# Patient Record
Sex: MALE | Race: WHITE | NOT HISPANIC OR LATINO | Employment: FULL TIME | URBAN - METROPOLITAN AREA
[De-identification: names, ages, dates, MRNs, and addresses within clinical notes are randomized per-mention and may not be internally consistent; named-entity substitution may affect disease eponyms.]

---

## 2023-11-09 PROBLEM — R35.0 BENIGN PROSTATIC HYPERPLASIA WITH URINARY FREQUENCY: Status: ACTIVE | Noted: 2023-11-09

## 2023-11-09 PROBLEM — E66.3 OVERWEIGHT WITH BODY MASS INDEX (BMI) OF 28 TO 28.9 IN ADULT: Status: ACTIVE | Noted: 2023-11-09

## 2023-11-09 PROBLEM — E78.5 HYPERLIPIDEMIA: Status: ACTIVE | Noted: 2023-11-09

## 2023-11-09 PROBLEM — I10 ESSENTIAL HYPERTENSION: Status: ACTIVE | Noted: 2023-11-09

## 2023-11-09 PROBLEM — N40.1 BENIGN PROSTATIC HYPERPLASIA WITH URINARY FREQUENCY: Status: ACTIVE | Noted: 2023-11-09

## 2023-11-09 PROBLEM — K21.9 GERD (GASTROESOPHAGEAL REFLUX DISEASE): Status: ACTIVE | Noted: 2023-11-09

## 2023-11-09 PROBLEM — R35.1 NOCTURIA: Status: ACTIVE | Noted: 2023-11-09

## 2023-11-09 PROBLEM — N52.9 ERECTILE DYSFUNCTION: Status: ACTIVE | Noted: 2023-11-09

## 2023-11-09 PROBLEM — Z78.9 NON-SMOKER: Status: ACTIVE | Noted: 2023-11-09

## 2023-11-09 RX ORDER — PRAVASTATIN SODIUM 20 MG/1
1 TABLET ORAL NIGHTLY
COMMUNITY
Start: 2022-04-25 | End: 2023-11-10 | Stop reason: SDUPTHER

## 2023-11-09 RX ORDER — TAMSULOSIN HYDROCHLORIDE 0.4 MG/1
1 CAPSULE ORAL NIGHTLY
COMMUNITY
Start: 2021-10-25 | End: 2024-05-13 | Stop reason: ALTCHOICE

## 2023-11-09 RX ORDER — LISINOPRIL 20 MG/1
1 TABLET ORAL DAILY
COMMUNITY
End: 2023-11-10 | Stop reason: SDUPTHER

## 2023-11-09 RX ORDER — OMEPRAZOLE 20 MG/1
1 CAPSULE, DELAYED RELEASE ORAL DAILY
COMMUNITY
Start: 2022-05-16 | End: 2023-11-10 | Stop reason: SDUPTHER

## 2023-11-09 RX ORDER — TADALAFIL 20 MG/1
1 TABLET ORAL DAILY PRN
COMMUNITY
Start: 2021-10-25 | End: 2024-05-13 | Stop reason: ALTCHOICE

## 2023-11-10 ENCOUNTER — OFFICE VISIT (OUTPATIENT)
Dept: PRIMARY CARE | Facility: CLINIC | Age: 58
End: 2023-11-10
Payer: COMMERCIAL

## 2023-11-10 VITALS
SYSTOLIC BLOOD PRESSURE: 145 MMHG | HEART RATE: 75 BPM | TEMPERATURE: 97.5 F | DIASTOLIC BLOOD PRESSURE: 77 MMHG | HEIGHT: 66 IN | WEIGHT: 175 LBS | BODY MASS INDEX: 28.12 KG/M2

## 2023-11-10 DIAGNOSIS — I10 ESSENTIAL HYPERTENSION: ICD-10-CM

## 2023-11-10 DIAGNOSIS — K21.9 GASTROESOPHAGEAL REFLUX DISEASE, UNSPECIFIED WHETHER ESOPHAGITIS PRESENT: ICD-10-CM

## 2023-11-10 DIAGNOSIS — R35.0 BENIGN PROSTATIC HYPERPLASIA WITH URINARY FREQUENCY: Primary | ICD-10-CM

## 2023-11-10 DIAGNOSIS — N40.1 BENIGN PROSTATIC HYPERPLASIA WITH URINARY FREQUENCY: Primary | ICD-10-CM

## 2023-11-10 DIAGNOSIS — E78.5 HYPERLIPIDEMIA, UNSPECIFIED HYPERLIPIDEMIA TYPE: ICD-10-CM

## 2023-11-10 PROCEDURE — 3078F DIAST BP <80 MM HG: CPT | Performed by: FAMILY MEDICINE

## 2023-11-10 PROCEDURE — 1036F TOBACCO NON-USER: CPT | Performed by: FAMILY MEDICINE

## 2023-11-10 PROCEDURE — 3077F SYST BP >= 140 MM HG: CPT | Performed by: FAMILY MEDICINE

## 2023-11-10 PROCEDURE — 99213 OFFICE O/P EST LOW 20 MIN: CPT | Performed by: FAMILY MEDICINE

## 2023-11-10 RX ORDER — LISINOPRIL 20 MG/1
20 TABLET ORAL DAILY
Qty: 90 TABLET | Refills: 1 | Status: SHIPPED | OUTPATIENT
Start: 2023-11-10

## 2023-11-10 RX ORDER — PRAVASTATIN SODIUM 20 MG/1
20 TABLET ORAL NIGHTLY
Qty: 90 TABLET | Refills: 1 | Status: SHIPPED | OUTPATIENT
Start: 2023-11-10

## 2023-11-10 RX ORDER — OMEPRAZOLE 20 MG/1
20 CAPSULE, DELAYED RELEASE ORAL DAILY
Qty: 90 CAPSULE | Refills: 1 | Status: SHIPPED | OUTPATIENT
Start: 2023-11-10 | End: 2024-04-25

## 2023-11-10 ASSESSMENT — ENCOUNTER SYMPTOMS
MUSCULOSKELETAL NEGATIVE: 1
ENDOCRINE NEGATIVE: 1
RESPIRATORY NEGATIVE: 1
EYES NEGATIVE: 1
CARDIOVASCULAR NEGATIVE: 1
GASTROINTESTINAL NEGATIVE: 1
HEMATOLOGIC/LYMPHATIC NEGATIVE: 1
ALLERGIC/IMMUNOLOGIC NEGATIVE: 1
CONSTITUTIONAL NEGATIVE: 1
PSYCHIATRIC NEGATIVE: 1

## 2023-11-10 ASSESSMENT — PATIENT HEALTH QUESTIONNAIRE - PHQ9
SUM OF ALL RESPONSES TO PHQ9 QUESTIONS 1 AND 2: 0
1. LITTLE INTEREST OR PLEASURE IN DOING THINGS: NOT AT ALL
2. FEELING DOWN, DEPRESSED OR HOPELESS: NOT AT ALL

## 2023-11-10 NOTE — PROGRESS NOTES
Bubba Ching is here for a follow-up on hypertension and hyperlipidemia.  He states that he has been feeling well and has no new complaints.  He continues on his meds noted without side effects.  His GERD has been stable with the use of omeprazole 20 mg daily.  He is due for follow-up labs including lipid profile.    Patient ID: Humza Lantigua is a 58 y.o. male who presents for Follow-up (Will need refills: No other concerns:):    Problem List Items Addressed This Visit       Essential hypertension    GERD (gastroesophageal reflux disease)    Hyperlipidemia      Past Medical History:   Diagnosis Date    Body mass index (BMI) 28.0-28.9, adult     BMI 28.0-28.9,adult    GERD (gastroesophageal reflux disease) 01/01/2000    Hypertension 01/01/2000    Overweight 03/29/2022    Overweight with body mass index (BMI) of 29 to 29.9 in adult    Overweight     Overweight    Personal history of other specified conditions     History of urinary frequency    Personal history of other specified conditions 12/27/2021    History of urinary urgency    Personal history of other specified conditions 12/27/2021    History of nocturia    Poor urinary stream 12/27/2021    Weak urinary stream      Past Surgical History:   Procedure Laterality Date    CHOLECYSTECTOMY  01/01/2000    OTHER SURGICAL HISTORY  10/25/2021    Cholelithotomy    OTHER SURGICAL HISTORY  02/02/2022    Cholecystectomy    OTHER SURGICAL HISTORY  03/29/2022    Colonoscopy      Family History   Problem Relation Name Age of Onset    Hypertension Mother      Diabetes Mother      Hypertension Father      Diabetes Father        Social History     Socioeconomic History    Marital status: Single     Spouse name: Not on file    Number of children: Not on file    Years of education: Not on file    Highest education level: Not on file   Occupational History    Not on file   Tobacco Use    Smoking status: Never    Smokeless tobacco: Never   Substance and Sexual Activity    Alcohol  use: Yes     Alcohol/week: 6.0 standard drinks of alcohol     Types: 6 Cans of beer per week     Comment: Monthly    Drug use: Never    Sexual activity: Not Currently     Partners: Female     Birth control/protection: None   Other Topics Concern    Not on file   Social History Narrative    Not on file     Social Determinants of Health     Financial Resource Strain: Not on file   Food Insecurity: Not on file   Transportation Needs: Not on file   Physical Activity: Not on file   Stress: Not on file   Social Connections: Not on file   Intimate Partner Violence: Not on file   Housing Stability: Not on file      Hay fever and allergy relief   Current Outpatient Medications   Medication Sig Dispense Refill    lisinopril 20 mg tablet Take 1 tablet (20 mg) by mouth once daily.      omeprazole (PriLOSEC) 20 mg DR capsule Take 1 capsule (20 mg) by mouth once daily.      pravastatin (Pravachol) 20 mg tablet Take 1 tablet (20 mg) by mouth once daily at bedtime.      tadalafil 20 mg tablet Take 1 tablet (20 mg) by mouth once daily as needed. 1 HOUR BEFORE NEEDED      tamsulosin (Flomax) 0.4 mg 24 hr capsule Take 1 capsule (0.4 mg) by mouth once daily at bedtime.       No current facility-administered medications for this visit.       Immunization History   Administered Date(s) Administered    Flu vaccine (IIV4), preservative free *Check age/dose* 09/27/2021, 09/30/2022    Influenza, Unspecified 11/01/2017    Influenza, injectable, MDCK, preservative free, quadrivalent 10/15/2018, 10/05/2020, 09/28/2023    Influenza, injectable, MDCK, quadrivalent 10/14/2019    Pfizer COVID-19 vaccine, bivalent, age 12 years and older (30 mcg/0.3 mL) 09/30/2022    Pfizer Gray Cap SARS-CoV-2 05/17/2022    Pfizer Purple Cap SARS-CoV-2 04/10/2021, 05/02/2021, 12/05/2021    Tdap vaccine, age 7 year and older (BOOSTRIX) 06/23/2020        Review of Systems   Constitutional: Negative.    HENT: Negative.     Eyes: Negative.    Respiratory: Negative.      Cardiovascular: Negative.    Gastrointestinal: Negative.    Endocrine: Negative.    Genitourinary: Negative.    Musculoskeletal: Negative.    Skin: Negative.    Allergic/Immunologic: Negative.    Hematological: Negative.    Psychiatric/Behavioral: Negative.     All other systems reviewed and are negative.       Vitals:    11/10/23 1037   BP: 145/77   Pulse: 75   Temp: 36.4 °C (97.5 °F)     Vitals:    11/10/23 1037   Weight: 79.4 kg (175 lb)      Physical Exam  Constitutional:       General: He is not in acute distress.     Appearance: Normal appearance. He is normal weight.   Cardiovascular:      Rate and Rhythm: Normal rate and regular rhythm.      Pulses: Normal pulses.      Heart sounds: Normal heart sounds. No murmur heard.     No friction rub. No gallop.   Pulmonary:      Effort: Pulmonary effort is normal.      Breath sounds: Normal breath sounds.   Neurological:      Mental Status: He is alert.   Psychiatric:         Mood and Affect: Mood normal.         Thought Content: Thought content normal.          ASSESSMENT/PLAN: Hypertension with slightly elevated reading  Hyperlipidemia  GERD stable  BPH followed by urology    Plan-check CBC CMP lipid profile and PSA  Continue current meds noted  Exercise regularly  Colonoscopy up-to-date  Needs to update Shingrix vaccination  Follow-up in 6 months pending above and call as needed

## 2024-02-10 ENCOUNTER — LAB (OUTPATIENT)
Dept: LAB | Facility: LAB | Age: 59
End: 2024-02-10
Payer: COMMERCIAL

## 2024-02-10 DIAGNOSIS — N40.1 BENIGN PROSTATIC HYPERPLASIA WITH URINARY FREQUENCY: ICD-10-CM

## 2024-02-10 DIAGNOSIS — I10 ESSENTIAL HYPERTENSION: ICD-10-CM

## 2024-02-10 DIAGNOSIS — K21.9 GASTROESOPHAGEAL REFLUX DISEASE, UNSPECIFIED WHETHER ESOPHAGITIS PRESENT: ICD-10-CM

## 2024-02-10 DIAGNOSIS — R35.0 BENIGN PROSTATIC HYPERPLASIA WITH URINARY FREQUENCY: ICD-10-CM

## 2024-02-10 LAB
ALBUMIN SERPL BCP-MCNC: 4.6 G/DL (ref 3.4–5)
ALP SERPL-CCNC: 52 U/L (ref 33–120)
ALT SERPL W P-5'-P-CCNC: 18 U/L (ref 10–52)
ANION GAP SERPL CALC-SCNC: 13 MMOL/L (ref 10–20)
AST SERPL W P-5'-P-CCNC: 16 U/L (ref 9–39)
BILIRUB SERPL-MCNC: 0.6 MG/DL (ref 0–1.2)
BUN SERPL-MCNC: 18 MG/DL (ref 6–23)
CALCIUM SERPL-MCNC: 9.5 MG/DL (ref 8.6–10.3)
CHLORIDE SERPL-SCNC: 104 MMOL/L (ref 98–107)
CHOLEST SERPL-MCNC: 161 MG/DL (ref 0–199)
CHOLESTEROL/HDL RATIO: 3.3
CO2 SERPL-SCNC: 26 MMOL/L (ref 21–32)
CREAT SERPL-MCNC: 0.94 MG/DL (ref 0.5–1.3)
EGFRCR SERPLBLD CKD-EPI 2021: >90 ML/MIN/1.73M*2
ERYTHROCYTE [DISTWIDTH] IN BLOOD BY AUTOMATED COUNT: 12.2 % (ref 11.5–14.5)
GLUCOSE SERPL-MCNC: 107 MG/DL (ref 74–99)
HCT VFR BLD AUTO: 45 % (ref 41–52)
HDLC SERPL-MCNC: 48.4 MG/DL
HGB BLD-MCNC: 14.8 G/DL (ref 13.5–17.5)
LDLC SERPL CALC-MCNC: 100 MG/DL
MCH RBC QN AUTO: 31.7 PG (ref 26–34)
MCHC RBC AUTO-ENTMCNC: 32.9 G/DL (ref 32–36)
MCV RBC AUTO: 96 FL (ref 80–100)
NON HDL CHOLESTEROL: 113 MG/DL (ref 0–149)
NRBC BLD-RTO: 0 /100 WBCS (ref 0–0)
PLATELET # BLD AUTO: 237 X10*3/UL (ref 150–450)
POTASSIUM SERPL-SCNC: 4.7 MMOL/L (ref 3.5–5.3)
PROT SERPL-MCNC: 7.1 G/DL (ref 6.4–8.2)
PSA SERPL-MCNC: 0.56 NG/ML
RBC # BLD AUTO: 4.67 X10*6/UL (ref 4.5–5.9)
SODIUM SERPL-SCNC: 138 MMOL/L (ref 136–145)
TRIGL SERPL-MCNC: 61 MG/DL (ref 0–149)
VLDL: 12 MG/DL (ref 0–40)
WBC # BLD AUTO: 5.2 X10*3/UL (ref 4.4–11.3)

## 2024-02-10 PROCEDURE — 80061 LIPID PANEL: CPT

## 2024-02-10 PROCEDURE — 85027 COMPLETE CBC AUTOMATED: CPT

## 2024-02-10 PROCEDURE — 84153 ASSAY OF PSA TOTAL: CPT

## 2024-02-10 PROCEDURE — 36415 COLL VENOUS BLD VENIPUNCTURE: CPT

## 2024-02-10 PROCEDURE — 80053 COMPREHEN METABOLIC PANEL: CPT

## 2024-04-24 DIAGNOSIS — K21.9 GASTROESOPHAGEAL REFLUX DISEASE, UNSPECIFIED WHETHER ESOPHAGITIS PRESENT: ICD-10-CM

## 2024-04-25 RX ORDER — OMEPRAZOLE 20 MG/1
20 CAPSULE, DELAYED RELEASE ORAL DAILY
Qty: 90 CAPSULE | Refills: 1 | Status: SHIPPED | OUTPATIENT
Start: 2024-04-25

## 2024-05-13 ENCOUNTER — OFFICE VISIT (OUTPATIENT)
Dept: PRIMARY CARE | Facility: CLINIC | Age: 59
End: 2024-05-13
Payer: COMMERCIAL

## 2024-05-13 VITALS
SYSTOLIC BLOOD PRESSURE: 124 MMHG | BODY MASS INDEX: 27.64 KG/M2 | HEART RATE: 80 BPM | HEIGHT: 66 IN | DIASTOLIC BLOOD PRESSURE: 74 MMHG | TEMPERATURE: 98.2 F | WEIGHT: 172 LBS

## 2024-05-13 DIAGNOSIS — E66.3 OVERWEIGHT WITH BODY MASS INDEX (BMI) OF 27 TO 27.9 IN ADULT: ICD-10-CM

## 2024-05-13 DIAGNOSIS — E78.2 MIXED HYPERLIPIDEMIA: ICD-10-CM

## 2024-05-13 DIAGNOSIS — Z78.9 NEVER SMOKED CIGARETTES: ICD-10-CM

## 2024-05-13 DIAGNOSIS — I10 ESSENTIAL HYPERTENSION: ICD-10-CM

## 2024-05-13 PROCEDURE — 1036F TOBACCO NON-USER: CPT | Performed by: FAMILY MEDICINE

## 2024-05-13 PROCEDURE — 99213 OFFICE O/P EST LOW 20 MIN: CPT | Performed by: FAMILY MEDICINE

## 2024-05-13 PROCEDURE — 3074F SYST BP LT 130 MM HG: CPT | Performed by: FAMILY MEDICINE

## 2024-05-13 PROCEDURE — 3008F BODY MASS INDEX DOCD: CPT | Performed by: FAMILY MEDICINE

## 2024-05-13 PROCEDURE — 3078F DIAST BP <80 MM HG: CPT | Performed by: FAMILY MEDICINE

## 2024-05-13 ASSESSMENT — ENCOUNTER SYMPTOMS
CONSTITUTIONAL NEGATIVE: 1
MUSCULOSKELETAL NEGATIVE: 1
GASTROINTESTINAL NEGATIVE: 1
CARDIOVASCULAR NEGATIVE: 1
HEMATOLOGIC/LYMPHATIC NEGATIVE: 1
RESPIRATORY NEGATIVE: 1
PSYCHIATRIC NEGATIVE: 1
EYES NEGATIVE: 1
ALLERGIC/IMMUNOLOGIC NEGATIVE: 1
ENDOCRINE NEGATIVE: 1

## 2024-05-13 NOTE — PROGRESS NOTES
Bubba Ching is here for follow-up on hypertension and hyperlipidemia.  He states that he has been feeling well and has no new complaints at the present time.  He continues on his meds noted without side effects.  He does not exercise on a regular basis.  He has never smoked.  He drinks about 6 cans of beer per week.    Patient ID: Humza Lantigua is a 58 y.o. male who presents for Follow-up:    Problem List Items Addressed This Visit       Essential hypertension    Hyperlipidemia     Other Visit Diagnoses       Never smoked cigarettes        Overweight with body mass index (BMI) of 27 to 27.9 in adult               Past Medical History:   Diagnosis Date    Body mass index (BMI) 28.0-28.9, adult     BMI 28.0-28.9,adult    GERD (gastroesophageal reflux disease) 01/01/2000    Hypertension 01/01/2000    Overweight 03/29/2022    Overweight with body mass index (BMI) of 29 to 29.9 in adult    Overweight     Overweight    Personal history of other specified conditions     History of urinary frequency    Personal history of other specified conditions 12/27/2021    History of urinary urgency    Personal history of other specified conditions 12/27/2021    History of nocturia    Poor urinary stream 12/27/2021    Weak urinary stream      Past Surgical History:   Procedure Laterality Date    CHOLECYSTECTOMY  01/01/2000    OTHER SURGICAL HISTORY  10/25/2021    Cholelithotomy    OTHER SURGICAL HISTORY  02/02/2022    Cholecystectomy    OTHER SURGICAL HISTORY  03/29/2022    Colonoscopy      Family History   Problem Relation Name Age of Onset    Hypertension Mother      Diabetes Mother      Hypertension Father      Diabetes Father        Social History     Socioeconomic History    Marital status: Single     Spouse name: Not on file    Number of children: Not on file    Years of education: Not on file    Highest education level: Not on file   Occupational History    Not on file   Tobacco Use    Smoking status: Never    Smokeless  tobacco: Never   Substance and Sexual Activity    Alcohol use: Yes     Alcohol/week: 6.0 standard drinks of alcohol     Types: 6 Cans of beer per week     Comment: Monthly    Drug use: Never    Sexual activity: Not Currently     Partners: Female     Birth control/protection: None   Other Topics Concern    Not on file   Social History Narrative    Not on file     Social Determinants of Health     Financial Resource Strain: Not on file   Food Insecurity: Not on file   Transportation Needs: Not on file   Physical Activity: Not on file   Stress: Not on file   Social Connections: Not on file   Intimate Partner Violence: Not on file   Housing Stability: Not on file      Hay fever and allergy relief   Current Outpatient Medications   Medication Sig Dispense Refill    lisinopril 20 mg tablet Take 1 tablet (20 mg) by mouth once daily. 90 tablet 1    omeprazole (PriLOSEC) 20 mg DR capsule TAKE 1 CAPSULE BY MOUTH EVERY DAY 90 capsule 1    pravastatin (Pravachol) 20 mg tablet Take 1 tablet (20 mg) by mouth once daily at bedtime. 90 tablet 1     No current facility-administered medications for this visit.       Immunization History   Administered Date(s) Administered    Flu vaccine (IIV4), preservative free *Check age/dose* 09/27/2021, 09/30/2022    Flu vaccine, quadrivalent, no egg protein, age 6 month or greater (FLUCELVAX) 10/15/2018, 10/05/2020, 09/28/2023    Influenza, Unspecified 11/01/2017    Influenza, injectable, MDCK, quadrivalent 10/14/2019    Pfizer COVID-19 vaccine, bivalent, age 12 years and older (30 mcg/0.3 mL) 09/30/2022    Pfizer Gray Cap SARS-CoV-2 05/17/2022    Pfizer Purple Cap SARS-CoV-2 04/10/2021, 05/02/2021, 12/05/2021    Tdap vaccine, age 7 year and older (BOOSTRIX, ADACEL) 06/23/2020        Review of Systems   Constitutional: Negative.    HENT: Negative.     Eyes: Negative.    Respiratory: Negative.     Cardiovascular: Negative.    Gastrointestinal: Negative.    Endocrine: Negative.    Genitourinary:  Negative.    Musculoskeletal: Negative.    Skin: Negative.    Allergic/Immunologic: Negative.    Hematological: Negative.    Psychiatric/Behavioral: Negative.     All other systems reviewed and are negative.       Vitals:    05/13/24 1600   BP: 124/74   Pulse: 80   Temp: 36.8 °C (98.2 °F)     Vitals:    05/13/24 1600   Weight: 78 kg (172 lb)      Physical Exam  Constitutional:       General: He is not in acute distress.     Appearance: Normal appearance.   Neck:      Vascular: No carotid bruit.   Cardiovascular:      Rate and Rhythm: Normal rate and regular rhythm.      Pulses: Normal pulses.      Heart sounds: Normal heart sounds. No murmur heard.     No friction rub. No gallop.   Pulmonary:      Effort: Pulmonary effort is normal. No respiratory distress.      Breath sounds: Normal breath sounds. No wheezing or rales.   Musculoskeletal:      Cervical back: Neck supple.   Neurological:      Mental Status: He is alert.          ASSESSMENT/PLAN: Hypertension stable.  Continue lisinopril daily as noted.    Hyperlipidemia stable with cholesterol 161 and .  Continue pravastatin daily.  Watch low-fat diet.    BPH.  PSA stable 0.56    GERD.  Continue omeprazole daily.    Continue current meds noted  Recommended shingles vaccination  Colonoscopy up-to-date  Follow-up 6 months and call as needed

## 2024-06-14 DIAGNOSIS — I10 ESSENTIAL HYPERTENSION: ICD-10-CM

## 2024-06-14 DIAGNOSIS — E78.5 HYPERLIPIDEMIA, UNSPECIFIED HYPERLIPIDEMIA TYPE: ICD-10-CM

## 2024-06-15 RX ORDER — PRAVASTATIN SODIUM 20 MG/1
20 TABLET ORAL NIGHTLY
Qty: 90 TABLET | Refills: 1 | Status: SHIPPED | OUTPATIENT
Start: 2024-06-15

## 2024-06-15 RX ORDER — LISINOPRIL 20 MG/1
20 TABLET ORAL DAILY
Qty: 90 TABLET | Refills: 1 | Status: SHIPPED | OUTPATIENT
Start: 2024-06-15

## 2024-11-03 DIAGNOSIS — K21.9 GASTROESOPHAGEAL REFLUX DISEASE, UNSPECIFIED WHETHER ESOPHAGITIS PRESENT: ICD-10-CM

## 2024-11-04 ENCOUNTER — APPOINTMENT (OUTPATIENT)
Dept: PRIMARY CARE | Facility: CLINIC | Age: 59
End: 2024-11-04
Payer: COMMERCIAL

## 2024-11-04 VITALS
BODY MASS INDEX: 27.84 KG/M2 | HEART RATE: 59 BPM | HEIGHT: 67 IN | WEIGHT: 177.4 LBS | SYSTOLIC BLOOD PRESSURE: 166 MMHG | DIASTOLIC BLOOD PRESSURE: 92 MMHG | TEMPERATURE: 97.9 F

## 2024-11-04 DIAGNOSIS — I10 ESSENTIAL HYPERTENSION: ICD-10-CM

## 2024-11-04 DIAGNOSIS — E66.3 OVERWEIGHT WITH BODY MASS INDEX (BMI) OF 28 TO 28.9 IN ADULT: ICD-10-CM

## 2024-11-04 DIAGNOSIS — E78.2 MIXED HYPERLIPIDEMIA: ICD-10-CM

## 2024-11-04 DIAGNOSIS — Z00.00 ANNUAL PHYSICAL EXAM: ICD-10-CM

## 2024-11-04 DIAGNOSIS — Z78.9 NEVER SMOKED CIGARETTES: ICD-10-CM

## 2024-11-04 DIAGNOSIS — Z12.5 PROSTATE CANCER SCREENING: ICD-10-CM

## 2024-11-04 PROCEDURE — 1036F TOBACCO NON-USER: CPT | Performed by: FAMILY MEDICINE

## 2024-11-04 PROCEDURE — 3077F SYST BP >= 140 MM HG: CPT | Performed by: FAMILY MEDICINE

## 2024-11-04 PROCEDURE — 99396 PREV VISIT EST AGE 40-64: CPT | Performed by: FAMILY MEDICINE

## 2024-11-04 PROCEDURE — 3080F DIAST BP >= 90 MM HG: CPT | Performed by: FAMILY MEDICINE

## 2024-11-04 PROCEDURE — 3008F BODY MASS INDEX DOCD: CPT | Performed by: FAMILY MEDICINE

## 2024-11-04 RX ORDER — PRAVASTATIN SODIUM 20 MG/1
20 TABLET ORAL NIGHTLY
Qty: 90 TABLET | Refills: 1 | Status: SHIPPED | OUTPATIENT
Start: 2024-11-04

## 2024-11-04 RX ORDER — LISINOPRIL 20 MG/1
20 TABLET ORAL DAILY
Qty: 90 TABLET | Refills: 1 | Status: SHIPPED | OUTPATIENT
Start: 2024-11-04

## 2024-11-04 ASSESSMENT — ENCOUNTER SYMPTOMS
CONSTITUTIONAL NEGATIVE: 1
GASTROINTESTINAL NEGATIVE: 1
ENDOCRINE NEGATIVE: 1
RESPIRATORY NEGATIVE: 1
EYES NEGATIVE: 1
PSYCHIATRIC NEGATIVE: 1
MUSCULOSKELETAL NEGATIVE: 1
HEMATOLOGIC/LYMPHATIC NEGATIVE: 1
ALLERGIC/IMMUNOLOGIC NEGATIVE: 1
CARDIOVASCULAR NEGATIVE: 1

## 2024-11-04 NOTE — PROGRESS NOTES
Subjective     Patient ID: Humza Lantigua is a 59 y.o. male who presents for Annual Exam (AWV):    Problem List Items Addressed This Visit    None     Past Medical History:   Diagnosis Date    Body mass index (BMI) 28.0-28.9, adult     BMI 28.0-28.9,adult    GERD (gastroesophageal reflux disease) 01/01/2000    Hypertension 01/01/2000    Overweight 03/29/2022    Overweight with body mass index (BMI) of 29 to 29.9 in adult    Overweight     Overweight    Personal history of other specified conditions     History of urinary frequency    Personal history of other specified conditions 12/27/2021    History of urinary urgency    Personal history of other specified conditions 12/27/2021    History of nocturia    Poor urinary stream 12/27/2021    Weak urinary stream      Past Surgical History:   Procedure Laterality Date    CHOLECYSTECTOMY  01/01/2000    OTHER SURGICAL HISTORY  10/25/2021    Cholelithotomy    OTHER SURGICAL HISTORY  02/02/2022    Cholecystectomy    OTHER SURGICAL HISTORY  03/29/2022    Colonoscopy      Family History   Problem Relation Name Age of Onset    Hypertension Mother      Diabetes Mother      Hypertension Father      Diabetes Father        Social History     Socioeconomic History    Marital status: Single     Spouse name: Not on file    Number of children: Not on file    Years of education: Not on file    Highest education level: Not on file   Occupational History    Not on file   Tobacco Use    Smoking status: Never    Smokeless tobacco: Never   Substance and Sexual Activity    Alcohol use: Yes     Alcohol/week: 6.0 standard drinks of alcohol     Types: 6 Cans of beer per week     Comment: Monthly    Drug use: Never    Sexual activity: Not Currently     Partners: Female     Birth control/protection: None   Other Topics Concern    Not on file   Social History Narrative    Not on file     Social Drivers of Health     Financial Resource Strain: Not on file   Food Insecurity: Not on file    Transportation Needs: Not on file   Physical Activity: Not on file   Stress: Not on file   Social Connections: Not on file   Intimate Partner Violence: Not on file   Housing Stability: Not on file      Hay fever and allergy relief   Current Outpatient Medications   Medication Sig Dispense Refill    lisinopril 20 mg tablet TAKE 1 TABLET BY MOUTH EVERY DAY 90 tablet 1    omeprazole (PriLOSEC) 20 mg DR capsule TAKE 1 CAPSULE BY MOUTH EVERY DAY 90 capsule 1    pravastatin (Pravachol) 20 mg tablet TAKE 1 TABLET (20 MG) BY MOUTH ONCE DAILY AT BEDTIME. 90 tablet 1     No current facility-administered medications for this visit.       Immunization History   Administered Date(s) Administered    Flu vaccine (IIV4), preservative free *Check age/dose* 09/27/2021, 09/30/2022    Flu vaccine, quadrivalent, no egg protein, age 6 month or greater (FLUCELVAX) 10/15/2018, 10/05/2020, 09/28/2023    Flu vaccine, trivalent, preservative free, age 6 months and greater (Fluarix/Fluzone/Flulaval) 11/01/2024    Influenza, Unspecified 11/01/2017    Influenza, injectable, MDCK, quadrivalent 10/14/2019    Pfizer COVID-19 vaccine, bivalent, age 12 years and older (30 mcg/0.3 mL) 09/30/2022    Pfizer Gray Cap SARS-CoV-2 05/17/2022    Pfizer Purple Cap SARS-CoV-2 04/10/2021, 05/02/2021, 12/05/2021    Tdap vaccine, age 7 year and older (BOOSTRIX, ADACEL) 06/23/2020    Zoster vaccine, recombinant, adult (SHINGRIX) 05/24/2024, 08/30/2024        Review of Systems     Vitals:    11/04/24 1627   BP: (!) 166/92   Pulse: 59   Temp: 36.6 °C (97.9 °F)     Vitals:    11/04/24 1627   Weight: 80.5 kg (177 lb 6.4 oz)      Physical Exam     ASSESSMENT/PLAN:       Scribe Attestation  By signing my name below, I, Bessie Zapien LPN, Scribe   attest that this documentation has been prepared under the direction and in the presence of Akhil Vaughan MD.

## 2024-11-04 NOTE — PROGRESS NOTES
Bubba Ching is here for an annual wellness visit.  He states that he has been overall feeling well and has no complaints.  He continues on his medications as noted without side effects.  His blood pressure is somewhat elevated today.  He denies any excessive caffeine alcohol or salt use.  His weight has been overall stable.    Patient ID: Humza Lantigua is a 59 y.o. male who presents for Annual Exam (AWV):    Problem List Items Addressed This Visit       Essential hypertension    Relevant Medications    lisinopril 20 mg tablet    Other Relevant Orders    CBC    Comprehensive Metabolic Panel    Lipid Panel    Hyperlipidemia    Relevant Medications    pravastatin (Pravachol) 20 mg tablet    Other Relevant Orders    CBC    Comprehensive Metabolic Panel    Lipid Panel    Overweight with body mass index (BMI) of 28 to 28.9 in adult    Relevant Orders    CBC    Comprehensive Metabolic Panel    Lipid Panel     Other Visit Diagnoses       Annual physical exam        Relevant Orders    CBC    Comprehensive Metabolic Panel    Lipid Panel    Never smoked cigarettes        Prostate cancer screening        Relevant Orders    Prostate Specific Antigen, Screen           Past Medical History:   Diagnosis Date    Body mass index (BMI) 28.0-28.9, adult     BMI 28.0-28.9,adult    GERD (gastroesophageal reflux disease) 01/01/2000    Hypertension 01/01/2000    Overweight 03/29/2022    Overweight with body mass index (BMI) of 29 to 29.9 in adult    Overweight     Overweight    Personal history of other specified conditions     History of urinary frequency    Personal history of other specified conditions 12/27/2021    History of urinary urgency    Personal history of other specified conditions 12/27/2021    History of nocturia    Poor urinary stream 12/27/2021    Weak urinary stream      Past Surgical History:   Procedure Laterality Date    CHOLECYSTECTOMY  01/01/2000    OTHER SURGICAL HISTORY  10/25/2021    Cholelithotomy    OTHER  SURGICAL HISTORY  02/02/2022    Cholecystectomy    OTHER SURGICAL HISTORY  03/29/2022    Colonoscopy      Family History   Problem Relation Name Age of Onset    Hypertension Mother      Diabetes Mother      Hypertension Father      Diabetes Father        Social History     Socioeconomic History    Marital status: Single     Spouse name: Not on file    Number of children: Not on file    Years of education: Not on file    Highest education level: Not on file   Occupational History    Not on file   Tobacco Use    Smoking status: Never    Smokeless tobacco: Never   Substance and Sexual Activity    Alcohol use: Yes     Alcohol/week: 6.0 standard drinks of alcohol     Types: 6 Cans of beer per week     Comment: Monthly    Drug use: Never    Sexual activity: Not Currently     Partners: Female     Birth control/protection: None   Other Topics Concern    Not on file   Social History Narrative    Not on file     Social Drivers of Health     Financial Resource Strain: Not on file   Food Insecurity: Not on file   Transportation Needs: Not on file   Physical Activity: Not on file   Stress: Not on file   Social Connections: Not on file   Intimate Partner Violence: Not on file   Housing Stability: Not on file      Hay fever and allergy relief   Current Outpatient Medications   Medication Sig Dispense Refill    omeprazole (PriLOSEC) 20 mg DR capsule TAKE 1 CAPSULE BY MOUTH EVERY DAY 90 capsule 1    lisinopril 20 mg tablet Take 1 tablet (20 mg) by mouth once daily. 90 tablet 1    pravastatin (Pravachol) 20 mg tablet Take 1 tablet (20 mg) by mouth once daily at bedtime. 90 tablet 1     No current facility-administered medications for this visit.       Immunization History   Administered Date(s) Administered    Flu vaccine (IIV4), preservative free *Check age/dose* 09/27/2021, 09/30/2022    Flu vaccine, quadrivalent, no egg protein, age 6 month or greater (FLUCELVAX) 10/15/2018, 10/05/2020, 09/28/2023    Flu vaccine, trivalent,  preservative free, age 6 months and greater (Fluarix/Fluzone/Flulaval) 11/01/2024    Influenza, Unspecified 11/01/2017    Influenza, injectable, MDCK, quadrivalent 10/14/2019    Pfizer COVID-19 vaccine, bivalent, age 12 years and older (30 mcg/0.3 mL) 09/30/2022    Pfizer Gray Cap SARS-CoV-2 05/17/2022    Pfizer Purple Cap SARS-CoV-2 04/10/2021, 05/02/2021, 12/05/2021    Tdap vaccine, age 7 year and older (BOOSTRIX, ADACEL) 06/23/2020    Zoster vaccine, recombinant, adult (SHINGRIX) 05/24/2024, 08/30/2024        Review of Systems   Constitutional: Negative.    HENT: Negative.     Eyes: Negative.    Respiratory: Negative.     Cardiovascular: Negative.    Gastrointestinal: Negative.    Endocrine: Negative.    Genitourinary: Negative.    Musculoskeletal: Negative.    Skin: Negative.    Allergic/Immunologic: Negative.    Hematological: Negative.    Psychiatric/Behavioral: Negative.     All other systems reviewed and are negative.       Vitals:    11/04/24 1627   BP: (!) 166/92   Pulse: 59   Temp: 36.6 °C (97.9 °F)     Vitals:    11/04/24 1627   Weight: 80.5 kg (177 lb 6.4 oz)      Physical Exam  Constitutional:       General: He is not in acute distress.     Appearance: Normal appearance.   Neck:      Vascular: No carotid bruit.   Cardiovascular:      Rate and Rhythm: Normal rate and regular rhythm.      Pulses: Normal pulses.      Heart sounds: Normal heart sounds. No murmur heard.     No friction rub. No gallop.   Pulmonary:      Effort: Pulmonary effort is normal. No respiratory distress.      Breath sounds: Normal breath sounds. No wheezing or rales.   Abdominal:      General: Abdomen is flat. There is no distension.      Palpations: Abdomen is soft. There is no mass.      Tenderness: There is no abdominal tenderness. There is no guarding.   Musculoskeletal:      Cervical back: Neck supple.   Neurological:      General: No focal deficit present.      Mental Status: He is alert and oriented to person, place, and  time. Mental status is at baseline.          ASSESSMENT/PLAN: Annual wellness visit.  Check CBC CMP lipid profile and PSA in February 2025.  Colonoscopy up-to-date.  Immunizations up-to-date.    Hypertension with elevated reading.  Continue current meds as noted.  Exercise regularly.    Hyperlipidemia.  Continue pravastatin daily.  Check lipid profile as above.    GERD stable.  Continue omeprazole daily.    Follow-up for recheck blood pressure in 3 months and call as needed

## 2024-11-06 RX ORDER — OMEPRAZOLE 20 MG/1
20 CAPSULE, DELAYED RELEASE ORAL DAILY
Qty: 90 CAPSULE | Refills: 1 | Status: SHIPPED | OUTPATIENT
Start: 2024-11-06

## 2025-01-31 LAB
ALBUMIN SERPL-MCNC: 4.5 G/DL (ref 3.6–5.1)
ALP SERPL-CCNC: 48 U/L (ref 35–144)
ALT SERPL-CCNC: 23 U/L (ref 9–46)
ANION GAP SERPL CALCULATED.4IONS-SCNC: 10 MMOL/L (CALC) (ref 7–17)
AST SERPL-CCNC: 20 U/L (ref 10–35)
BILIRUB SERPL-MCNC: 0.7 MG/DL (ref 0.2–1.2)
BUN SERPL-MCNC: 12 MG/DL (ref 7–25)
CALCIUM SERPL-MCNC: 9 MG/DL (ref 8.6–10.3)
CHLORIDE SERPL-SCNC: 104 MMOL/L (ref 98–110)
CHOLEST SERPL-MCNC: 147 MG/DL
CHOLEST/HDLC SERPL: 3.1 (CALC)
CO2 SERPL-SCNC: 26 MMOL/L (ref 20–32)
CREAT SERPL-MCNC: 0.9 MG/DL (ref 0.7–1.3)
EGFRCR SERPLBLD CKD-EPI 2021: 98 ML/MIN/1.73M2
ERYTHROCYTE [DISTWIDTH] IN BLOOD BY AUTOMATED COUNT: 12.2 % (ref 11–15)
GLUCOSE SERPL-MCNC: 90 MG/DL (ref 65–99)
HCT VFR BLD AUTO: 39.7 % (ref 38.5–50)
HDLC SERPL-MCNC: 48 MG/DL
HGB BLD-MCNC: 13.6 G/DL (ref 13.2–17.1)
LDLC SERPL CALC-MCNC: 84 MG/DL (CALC)
MCH RBC QN AUTO: 32.2 PG (ref 27–33)
MCHC RBC AUTO-ENTMCNC: 34.3 G/DL (ref 32–36)
MCV RBC AUTO: 93.9 FL (ref 80–100)
NONHDLC SERPL-MCNC: 99 MG/DL (CALC)
PLATELET # BLD AUTO: 241 THOUSAND/UL (ref 140–400)
PMV BLD REES-ECKER: 10.3 FL (ref 7.5–12.5)
POTASSIUM SERPL-SCNC: 3.6 MMOL/L (ref 3.5–5.3)
PROT SERPL-MCNC: 6.8 G/DL (ref 6.1–8.1)
PSA SERPL-MCNC: 0.57 NG/ML
RBC # BLD AUTO: 4.23 MILLION/UL (ref 4.2–5.8)
SODIUM SERPL-SCNC: 140 MMOL/L (ref 135–146)
TRIGL SERPL-MCNC: 66 MG/DL
WBC # BLD AUTO: 6.3 THOUSAND/UL (ref 3.8–10.8)

## 2025-02-06 ENCOUNTER — APPOINTMENT (OUTPATIENT)
Dept: PRIMARY CARE | Facility: CLINIC | Age: 60
End: 2025-02-06
Payer: COMMERCIAL

## 2025-02-06 VITALS
HEIGHT: 67 IN | TEMPERATURE: 98.3 F | WEIGHT: 176.6 LBS | BODY MASS INDEX: 27.72 KG/M2 | HEART RATE: 79 BPM | SYSTOLIC BLOOD PRESSURE: 143 MMHG | DIASTOLIC BLOOD PRESSURE: 87 MMHG

## 2025-02-06 DIAGNOSIS — E66.3 OVERWEIGHT WITH BODY MASS INDEX (BMI) OF 28 TO 28.9 IN ADULT: ICD-10-CM

## 2025-02-06 DIAGNOSIS — Z78.9 NEVER SMOKED CIGARETTES: ICD-10-CM

## 2025-02-06 DIAGNOSIS — I10 ESSENTIAL HYPERTENSION: ICD-10-CM

## 2025-02-06 PROCEDURE — 3079F DIAST BP 80-89 MM HG: CPT | Performed by: FAMILY MEDICINE

## 2025-02-06 PROCEDURE — 99213 OFFICE O/P EST LOW 20 MIN: CPT | Performed by: FAMILY MEDICINE

## 2025-02-06 PROCEDURE — 3077F SYST BP >= 140 MM HG: CPT | Performed by: FAMILY MEDICINE

## 2025-02-06 PROCEDURE — 1036F TOBACCO NON-USER: CPT | Performed by: FAMILY MEDICINE

## 2025-02-06 PROCEDURE — 3008F BODY MASS INDEX DOCD: CPT | Performed by: FAMILY MEDICINE

## 2025-02-06 RX ORDER — LISINOPRIL 30 MG/1
30 TABLET ORAL DAILY
Qty: 90 TABLET | Refills: 3 | Status: SHIPPED | OUTPATIENT
Start: 2025-02-06

## 2025-02-06 ASSESSMENT — ENCOUNTER SYMPTOMS
CONSTITUTIONAL NEGATIVE: 1
CARDIOVASCULAR NEGATIVE: 1
RESPIRATORY NEGATIVE: 1
PSYCHIATRIC NEGATIVE: 1
EYES NEGATIVE: 1
MUSCULOSKELETAL NEGATIVE: 1
HEMATOLOGIC/LYMPHATIC NEGATIVE: 1
GASTROINTESTINAL NEGATIVE: 1
ENDOCRINE NEGATIVE: 1
ALLERGIC/IMMUNOLOGIC NEGATIVE: 1

## 2025-02-06 ASSESSMENT — PATIENT HEALTH QUESTIONNAIRE - PHQ9
2. FEELING DOWN, DEPRESSED OR HOPELESS: NOT AT ALL
1. LITTLE INTEREST OR PLEASURE IN DOING THINGS: NOT AT ALL
SUM OF ALL RESPONSES TO PHQ9 QUESTIONS 1 AND 2: 0

## 2025-02-06 NOTE — PROGRESS NOTES
Subjective     Patient ID: Humza Lantigua is a 59 y.o. male who presents for Follow-up (3m follow up):    Problem List Items Addressed This Visit    None     Past Medical History:   Diagnosis Date    Body mass index (BMI) 28.0-28.9, adult     BMI 28.0-28.9,adult    GERD (gastroesophageal reflux disease) 01/01/2000    Hypertension 01/01/2000    Overweight 03/29/2022    Overweight with body mass index (BMI) of 29 to 29.9 in adult    Overweight     Overweight    Personal history of other specified conditions     History of urinary frequency    Personal history of other specified conditions 12/27/2021    History of urinary urgency    Personal history of other specified conditions 12/27/2021    History of nocturia    Poor urinary stream 12/27/2021    Weak urinary stream      Past Surgical History:   Procedure Laterality Date    CHOLECYSTECTOMY  01/01/2000    OTHER SURGICAL HISTORY  10/25/2021    Cholelithotomy    OTHER SURGICAL HISTORY  02/02/2022    Cholecystectomy    OTHER SURGICAL HISTORY  03/29/2022    Colonoscopy      Family History   Problem Relation Name Age of Onset    Hypertension Mother      Diabetes Mother      Hypertension Father      Diabetes Father        Social History     Socioeconomic History    Marital status: Single     Spouse name: Not on file    Number of children: Not on file    Years of education: Not on file    Highest education level: Not on file   Occupational History    Not on file   Tobacco Use    Smoking status: Never    Smokeless tobacco: Never   Substance and Sexual Activity    Alcohol use: Yes     Alcohol/week: 6.0 standard drinks of alcohol     Types: 6 Cans of beer per week     Comment: Monthly    Drug use: Never    Sexual activity: Not Currently     Partners: Female     Birth control/protection: None   Other Topics Concern    Not on file   Social History Narrative    Not on file     Social Drivers of Health     Financial Resource Strain: Not on file   Food Insecurity: Not on file    Transportation Needs: Not on file   Physical Activity: Not on file   Stress: Not on file   Social Connections: Not on file   Intimate Partner Violence: Not on file   Housing Stability: Not on file      Hay fever and allergy relief   Current Outpatient Medications   Medication Sig Dispense Refill    lisinopril 20 mg tablet Take 1 tablet (20 mg) by mouth once daily. 90 tablet 1    omeprazole (PriLOSEC) 20 mg DR capsule TAKE 1 CAPSULE BY MOUTH EVERY DAY 90 capsule 1    pravastatin (Pravachol) 20 mg tablet Take 1 tablet (20 mg) by mouth once daily at bedtime. 90 tablet 1     No current facility-administered medications for this visit.       Immunization History   Administered Date(s) Administered    COVID-19, mRNA, LNP-S, PF, 30 mcg/0.3 mL dose 04/10/2021, 05/02/2021, 12/05/2021    Flu vaccine (IIV4), preservative free *Check age/dose* 09/27/2021, 09/30/2022    Flu vaccine, quadrivalent, no egg protein, age 6 month or greater (FLUCELVAX) 10/15/2018, 10/05/2020, 09/28/2023    Flu vaccine, trivalent, preservative free, age 6 months and greater (Fluarix/Fluzone/Flulaval) 11/01/2024    Influenza, Unspecified 11/01/2017    Influenza, injectable, MDCK, quadrivalent 10/14/2019    Pfizer COVID-19 vaccine, bivalent, age 12 years and older (30 mcg/0.3 mL) 09/30/2022    Pfizer Gray Cap SARS-CoV-2 05/17/2022    Tdap vaccine, age 7 year and older (BOOSTRIX, ADACEL) 06/23/2020    Zoster vaccine, recombinant, adult (SHINGRIX) 05/24/2024, 08/30/2024        Review of Systems     Vitals:    02/06/25 1554   BP: 143/87   Pulse: 79   Temp: 36.8 °C (98.3 °F)     Vitals:    02/06/25 1554   Weight: 80.1 kg (176 lb 9.6 oz)      Physical Exam     ASSESSMENT/PLAN:       Scribe Attestation  By signing my name below, I, Bessie Zapien LPN, Scribe   attest that this documentation has been prepared under the direction and in the presence of Akhil Vaughan MD.

## 2025-02-06 NOTE — PROGRESS NOTES
Bubba Ching is here for follow-up on his hypertension.  He states that he is feeling well and has no complaints.  His blood pressure had been elevated at his last office visit 3 months ago and observation and monitoring was recommended.  He continues also on omeprazole and pravastatin as noted.  He denies regular alcohol use.  He does not feel any excessive stress at the present time.    Patient ID: Humza Lantigua is a 59 y.o. male who presents for Follow-up (3m follow up):    Problem List Items Addressed This Visit       Essential hypertension    Overweight with body mass index (BMI) of 28 to 28.9 in adult     Other Visit Diagnoses       Never smoked cigarettes               Past Medical History:   Diagnosis Date    Body mass index (BMI) 28.0-28.9, adult     BMI 28.0-28.9,adult    GERD (gastroesophageal reflux disease) 01/01/2000    Hypertension 01/01/2000    Overweight 03/29/2022    Overweight with body mass index (BMI) of 29 to 29.9 in adult    Overweight     Overweight    Personal history of other specified conditions     History of urinary frequency    Personal history of other specified conditions 12/27/2021    History of urinary urgency    Personal history of other specified conditions 12/27/2021    History of nocturia    Poor urinary stream 12/27/2021    Weak urinary stream      Past Surgical History:   Procedure Laterality Date    CHOLECYSTECTOMY  01/01/2000    OTHER SURGICAL HISTORY  10/25/2021    Cholelithotomy    OTHER SURGICAL HISTORY  02/02/2022    Cholecystectomy    OTHER SURGICAL HISTORY  03/29/2022    Colonoscopy      Family History   Problem Relation Name Age of Onset    Hypertension Mother      Diabetes Mother      Hypertension Father      Diabetes Father        Social History     Socioeconomic History    Marital status: Single     Spouse name: Not on file    Number of children: Not on file    Years of education: Not on file    Highest education level: Not on file   Occupational History    Not  on file   Tobacco Use    Smoking status: Never    Smokeless tobacco: Never   Substance and Sexual Activity    Alcohol use: Yes     Alcohol/week: 6.0 standard drinks of alcohol     Types: 6 Cans of beer per week     Comment: Monthly    Drug use: Never    Sexual activity: Not Currently     Partners: Female     Birth control/protection: None   Other Topics Concern    Not on file   Social History Narrative    Not on file     Social Drivers of Health     Financial Resource Strain: Not on file   Food Insecurity: Not on file   Transportation Needs: Not on file   Physical Activity: Not on file   Stress: Not on file   Social Connections: Not on file   Intimate Partner Violence: Not on file   Housing Stability: Not on file      Hay fever and allergy relief   Current Outpatient Medications   Medication Sig Dispense Refill    lisinopril 20 mg tablet Take 1 tablet (20 mg) by mouth once daily. 90 tablet 1    omeprazole (PriLOSEC) 20 mg DR capsule TAKE 1 CAPSULE BY MOUTH EVERY DAY 90 capsule 1    pravastatin (Pravachol) 20 mg tablet Take 1 tablet (20 mg) by mouth once daily at bedtime. 90 tablet 1     No current facility-administered medications for this visit.       Immunization History   Administered Date(s) Administered    COVID-19, mRNA, LNP-S, PF, 30 mcg/0.3 mL dose 04/10/2021, 05/02/2021, 12/05/2021    Flu vaccine (IIV4), preservative free *Check age/dose* 09/27/2021, 09/30/2022    Flu vaccine, quadrivalent, no egg protein, age 6 month or greater (FLUCELVAX) 10/15/2018, 10/05/2020, 09/28/2023    Flu vaccine, trivalent, preservative free, age 6 months and greater (Fluarix/Fluzone/Flulaval) 11/01/2024    Influenza, Unspecified 11/01/2017    Influenza, injectable, MDCK, quadrivalent 10/14/2019    Pfizer COVID-19 vaccine, bivalent, age 12 years and older (30 mcg/0.3 mL) 09/30/2022    Pfizer Gray Cap SARS-CoV-2 05/17/2022    Tdap vaccine, age 7 year and older (BOOSTRIX, ADACEL) 06/23/2020    Zoster vaccine, recombinant, adult  (SHINGRIX) 05/24/2024, 08/30/2024        Review of Systems   Constitutional: Negative.    HENT: Negative.     Eyes: Negative.    Respiratory: Negative.     Cardiovascular: Negative.    Gastrointestinal: Negative.    Endocrine: Negative.    Genitourinary: Negative.    Musculoskeletal: Negative.    Skin: Negative.    Allergic/Immunologic: Negative.    Hematological: Negative.    Psychiatric/Behavioral: Negative.     All other systems reviewed and are negative.       Vitals:    02/06/25 1554   BP: 143/87   Pulse: 79   Temp: 36.8 °C (98.3 °F)     Vitals:    02/06/25 1554   Weight: 80.1 kg (176 lb 9.6 oz)      Physical Exam  Constitutional:       General: He is not in acute distress.     Appearance: Normal appearance.   Neurological:      Mental Status: He is alert.          ASSESSMENT/PLAN: Hypertension improved but still borderline elevated.  Increase lisinopril to 30 mg daily.  We discussed potential side effects.    Hyperlipidemia stable.  Continue pravastatin daily.    GERD stable on omeprazole    Follow-up in 3 months for blood pressure reevaluation and call as needed

## 2025-05-04 DIAGNOSIS — K21.9 GASTROESOPHAGEAL REFLUX DISEASE, UNSPECIFIED WHETHER ESOPHAGITIS PRESENT: ICD-10-CM

## 2025-05-07 RX ORDER — OMEPRAZOLE 20 MG/1
20 CAPSULE, DELAYED RELEASE ORAL DAILY
Qty: 90 CAPSULE | Refills: 1 | Status: SHIPPED | OUTPATIENT
Start: 2025-05-07

## 2025-06-05 ENCOUNTER — APPOINTMENT (OUTPATIENT)
Dept: PRIMARY CARE | Facility: CLINIC | Age: 60
End: 2025-06-05
Payer: COMMERCIAL

## 2025-06-05 VITALS
HEIGHT: 67 IN | BODY MASS INDEX: 28.09 KG/M2 | WEIGHT: 179 LBS | DIASTOLIC BLOOD PRESSURE: 81 MMHG | HEART RATE: 78 BPM | OXYGEN SATURATION: 97 % | SYSTOLIC BLOOD PRESSURE: 125 MMHG

## 2025-06-05 DIAGNOSIS — E78.2 MIXED HYPERLIPIDEMIA: ICD-10-CM

## 2025-06-05 DIAGNOSIS — E66.3 OVERWEIGHT WITH BODY MASS INDEX (BMI) OF 28 TO 28.9 IN ADULT: ICD-10-CM

## 2025-06-05 DIAGNOSIS — Z78.9 NEVER SMOKED CIGARETTES: ICD-10-CM

## 2025-06-05 DIAGNOSIS — K21.9 GASTROESOPHAGEAL REFLUX DISEASE, UNSPECIFIED WHETHER ESOPHAGITIS PRESENT: ICD-10-CM

## 2025-06-05 DIAGNOSIS — I10 ESSENTIAL HYPERTENSION: ICD-10-CM

## 2025-06-05 PROCEDURE — 3008F BODY MASS INDEX DOCD: CPT | Performed by: FAMILY MEDICINE

## 2025-06-05 PROCEDURE — 1036F TOBACCO NON-USER: CPT | Performed by: FAMILY MEDICINE

## 2025-06-05 PROCEDURE — 3079F DIAST BP 80-89 MM HG: CPT | Performed by: FAMILY MEDICINE

## 2025-06-05 PROCEDURE — 3074F SYST BP LT 130 MM HG: CPT | Performed by: FAMILY MEDICINE

## 2025-06-05 PROCEDURE — 99213 OFFICE O/P EST LOW 20 MIN: CPT | Performed by: FAMILY MEDICINE

## 2025-06-05 RX ORDER — AMLODIPINE BESYLATE 5 MG/1
5 TABLET ORAL DAILY
Qty: 90 TABLET | Refills: 3 | Status: SHIPPED | OUTPATIENT
Start: 2025-06-05

## 2025-06-05 RX ORDER — PRAVASTATIN SODIUM 20 MG/1
20 TABLET ORAL NIGHTLY
Qty: 90 TABLET | Refills: 3 | Status: SHIPPED | OUTPATIENT
Start: 2025-06-05

## 2025-06-05 RX ORDER — LISINOPRIL 30 MG/1
30 TABLET ORAL DAILY
Qty: 90 TABLET | Refills: 3 | Status: SHIPPED | OUTPATIENT
Start: 2025-06-05

## 2025-06-05 RX ORDER — OMEPRAZOLE 20 MG/1
20 CAPSULE, DELAYED RELEASE ORAL DAILY
Qty: 90 CAPSULE | Refills: 3 | Status: SHIPPED | OUTPATIENT
Start: 2025-06-05

## 2025-06-05 ASSESSMENT — ENCOUNTER SYMPTOMS
GASTROINTESTINAL NEGATIVE: 1
ENDOCRINE NEGATIVE: 1
ALLERGIC/IMMUNOLOGIC NEGATIVE: 1
EYES NEGATIVE: 1
CARDIOVASCULAR NEGATIVE: 1
HEMATOLOGIC/LYMPHATIC NEGATIVE: 1
MUSCULOSKELETAL NEGATIVE: 1
CONSTITUTIONAL NEGATIVE: 1
RESPIRATORY NEGATIVE: 1
PSYCHIATRIC NEGATIVE: 1

## 2025-06-05 ASSESSMENT — LIFESTYLE VARIABLES
AUDIT-C TOTAL SCORE: 5
HOW OFTEN DO YOU HAVE A DRINK CONTAINING ALCOHOL: 2-4 TIMES A MONTH
HAS A RELATIVE, FRIEND, DOCTOR, OR ANOTHER HEALTH PROFESSIONAL EXPRESSED CONCERN ABOUT YOUR DRINKING OR SUGGESTED YOU CUT DOWN: NO
AUDIT TOTAL SCORE: -1
HAVE YOU OR SOMEONE ELSE BEEN INJURED AS A RESULT OF YOUR DRINKING: NO
HOW MANY STANDARD DRINKS CONTAINING ALCOHOL DO YOU HAVE ON A TYPICAL DAY: 3 OR 4
HOW OFTEN DO YOU HAVE SIX OR MORE DRINKS ON ONE OCCASION: MONTHLY
SKIP TO QUESTIONS 9-10: 0

## 2025-06-05 ASSESSMENT — PATIENT HEALTH QUESTIONNAIRE - PHQ9
2. FEELING DOWN, DEPRESSED OR HOPELESS: NOT AT ALL
SUM OF ALL RESPONSES TO PHQ9 QUESTIONS 1 AND 2: 0
1. LITTLE INTEREST OR PLEASURE IN DOING THINGS: NOT AT ALL

## 2025-06-05 NOTE — PROGRESS NOTES
Bubba Ching is here for follow-up on hypertension.  He feels well and has no complaints.  He continues on his lisinopril 30 mg daily the dose of this had been increased about 4 months ago.  He is tolerating this well.  The patient retired recently and stress levels are stable.  He denies any excessive alcohol or salt intake.  He stays active by walking on a regular basis.    Patient ID: Humza Lantigua is a 60 y.o. male who presents for Follow-up:    Problem List Items Addressed This Visit    None     Medical History[1]   Surgical History[2]   Family History[3]   Social History     Socioeconomic History    Marital status: Single     Spouse name: Not on file    Number of children: Not on file    Years of education: Not on file    Highest education level: Not on file   Occupational History    Not on file   Tobacco Use    Smoking status: Never    Smokeless tobacco: Never   Vaping Use    Vaping status: Never Used   Substance and Sexual Activity    Alcohol use: Yes     Alcohol/week: 6.0 standard drinks of alcohol     Types: 6 Cans of beer per week     Comment: Monthly    Drug use: Never    Sexual activity: Not Currently     Partners: Female     Birth control/protection: None   Other Topics Concern    Not on file   Social History Narrative    Not on file     Social Drivers of Health     Financial Resource Strain: Not on file   Food Insecurity: Not on file   Transportation Needs: Not on file   Physical Activity: Not on file   Stress: Not on file   Social Connections: Not on file   Intimate Partner Violence: Not on file   Housing Stability: Not on file      Hay fever and allergy relief   Current Medications[4]    Immunization History   Administered Date(s) Administered    COVID-19, mRNA, LNP-S, PF, 30 mcg/0.3 mL dose 04/10/2021, 05/02/2021, 12/05/2021    Flu vaccine (IIV4), preservative free *Check age/dose* 09/27/2021, 09/30/2022    Flu vaccine, quadrivalent, no egg protein, age 6 month or greater (FLUCELVAX) 10/15/2018,  10/05/2020, 09/28/2023    Flu vaccine, trivalent, preservative free, age 6 months and greater (Fluarix/Fluzone/Flulaval) 11/01/2024    Influenza, Unspecified 11/01/2017    Influenza, injectable, MDCK, quadrivalent 10/14/2019    Pfizer COVID-19 vaccine, bivalent, age 12 years and older (30 mcg/0.3 mL) 09/30/2022    Pfizer Gray Cap SARS-CoV-2 05/17/2022    Tdap vaccine, age 7 year and older (BOOSTRIX, ADACEL) 06/23/2020    Zoster vaccine, recombinant, adult (SHINGRIX) 05/24/2024, 08/30/2024        Review of Systems   Constitutional: Negative.    HENT: Negative.     Eyes: Negative.    Respiratory: Negative.     Cardiovascular: Negative.    Gastrointestinal: Negative.    Endocrine: Negative.    Genitourinary: Negative.    Musculoskeletal: Negative.    Skin: Negative.    Allergic/Immunologic: Negative.    Hematological: Negative.    Psychiatric/Behavioral: Negative.     All other systems reviewed and are negative.       Vitals:    06/05/25 0850   BP: 159/84   Pulse: 78   SpO2: 97%     Vitals:    06/05/25 0850   Weight: 81.2 kg (179 lb)      Physical Exam  Constitutional:       General: He is not in acute distress.     Appearance: Normal appearance.   Cardiovascular:      Rate and Rhythm: Normal rate and regular rhythm.      Pulses: Normal pulses.      Heart sounds: Normal heart sounds. No murmur heard.     No friction rub. No gallop.   Pulmonary:      Effort: Pulmonary effort is normal. No respiratory distress.      Breath sounds: Normal breath sounds. No wheezing or rales.   Neurological:      Mental Status: He is alert.          ASSESSMENT/PLAN: Hypertension with elevated reading as well as disparate blood pressure readings.  146/90 in the left arm and 125/81 in the right arm  Continue lisinopril 30 mg daily.  Add amlodipine 5 mg daily.  We discussed potential side effects.  Continue to exercise regularly and monitor salt in diet.  Continue other meds noted  Follow-up in 4 to 6 weeks and call as needed       Scribe  Attestation  By signing my name below, I, Bessie Zapien LPN, [place]   attest that this documentation has been prepared under the direction and in the presence of Akhil Vaughan MD.         [1]   Past Medical History:  Diagnosis Date    Body mass index (BMI) 28.0-28.9, adult     BMI 28.0-28.9,adult    GERD (gastroesophageal reflux disease) 01/01/2000    Hypertension 01/01/2000    Overweight 03/29/2022    Overweight with body mass index (BMI) of 29 to 29.9 in adult    Overweight     Overweight    Personal history of other specified conditions     History of urinary frequency    Personal history of other specified conditions 12/27/2021    History of urinary urgency    Personal history of other specified conditions 12/27/2021    History of nocturia    Poor urinary stream 12/27/2021    Weak urinary stream   [2]   Past Surgical History:  Procedure Laterality Date    CHOLECYSTECTOMY  01/01/2000    OTHER SURGICAL HISTORY  10/25/2021    Cholelithotomy    OTHER SURGICAL HISTORY  02/02/2022    Cholecystectomy    OTHER SURGICAL HISTORY  03/29/2022    Colonoscopy   [3]   Family History  Problem Relation Name Age of Onset    Hypertension Mother      Diabetes Mother      Hypertension Father      Diabetes Father     [4]   Current Outpatient Medications   Medication Sig Dispense Refill    lisinopril 30 mg tablet Take 1 tablet (30 mg) by mouth once daily. 90 tablet 3    omeprazole (PriLOSEC) 20 mg DR capsule TAKE 1 CAPSULE BY MOUTH EVERY DAY 90 capsule 1    pravastatin (Pravachol) 20 mg tablet Take 1 tablet (20 mg) by mouth once daily at bedtime. 90 tablet 1     No current facility-administered medications for this visit.

## 2025-06-05 NOTE — PATIENT INSTRUCTIONS
Begin amlodipine 5 mg daily  Continue to exercise  Limit alcohol and salt  Watch weight  Return 1 month

## 2025-07-14 ENCOUNTER — APPOINTMENT (OUTPATIENT)
Dept: PRIMARY CARE | Facility: CLINIC | Age: 60
End: 2025-07-14
Payer: COMMERCIAL

## 2025-07-14 VITALS
OXYGEN SATURATION: 97 % | WEIGHT: 180.6 LBS | BODY MASS INDEX: 28.71 KG/M2 | HEART RATE: 72 BPM | DIASTOLIC BLOOD PRESSURE: 78 MMHG | TEMPERATURE: 97.3 F | SYSTOLIC BLOOD PRESSURE: 132 MMHG

## 2025-07-14 DIAGNOSIS — Z78.9 NEVER SMOKED CIGARETTES: ICD-10-CM

## 2025-07-14 DIAGNOSIS — K21.9 GASTROESOPHAGEAL REFLUX DISEASE, UNSPECIFIED WHETHER ESOPHAGITIS PRESENT: ICD-10-CM

## 2025-07-14 DIAGNOSIS — I10 ESSENTIAL HYPERTENSION: ICD-10-CM

## 2025-07-14 DIAGNOSIS — E66.3 OVERWEIGHT WITH BODY MASS INDEX (BMI) OF 28 TO 28.9 IN ADULT: ICD-10-CM

## 2025-07-14 PROCEDURE — 3075F SYST BP GE 130 - 139MM HG: CPT | Performed by: FAMILY MEDICINE

## 2025-07-14 PROCEDURE — 3078F DIAST BP <80 MM HG: CPT | Performed by: FAMILY MEDICINE

## 2025-07-14 PROCEDURE — 99213 OFFICE O/P EST LOW 20 MIN: CPT | Performed by: FAMILY MEDICINE

## 2025-07-14 PROCEDURE — 1036F TOBACCO NON-USER: CPT | Performed by: FAMILY MEDICINE

## 2025-07-14 RX ORDER — PANTOPRAZOLE SODIUM 20 MG/1
20 TABLET, DELAYED RELEASE ORAL DAILY
Qty: 90 TABLET | Refills: 3 | Status: SHIPPED | OUTPATIENT
Start: 2025-07-14

## 2025-07-14 ASSESSMENT — PATIENT HEALTH QUESTIONNAIRE - PHQ9
1. LITTLE INTEREST OR PLEASURE IN DOING THINGS: NOT AT ALL
SUM OF ALL RESPONSES TO PHQ9 QUESTIONS 1 AND 2: 0
2. FEELING DOWN, DEPRESSED OR HOPELESS: NOT AT ALL

## 2025-07-14 ASSESSMENT — ENCOUNTER SYMPTOMS
RESPIRATORY NEGATIVE: 1
ENDOCRINE NEGATIVE: 1
CONSTITUTIONAL NEGATIVE: 1
CARDIOVASCULAR NEGATIVE: 1
MUSCULOSKELETAL NEGATIVE: 1
EYES NEGATIVE: 1
HEMATOLOGIC/LYMPHATIC NEGATIVE: 1
ALLERGIC/IMMUNOLOGIC NEGATIVE: 1
GASTROINTESTINAL NEGATIVE: 1
PSYCHIATRIC NEGATIVE: 1

## 2025-07-14 NOTE — PROGRESS NOTES
Subjective Humza is here for a follow-up on his hypertension.  1 month ago we had added amlodipine 5 mg to his lisinopril 30 mg daily.  He is tolerating this without any side effects.  He is requesting an alternative to omeprazole for his GERD symptoms.  Omeprazole apparently is no longer covered by his insurance.  He otherwise has no complaints and has been doing well.    Patient ID: Humza Lantigua is a 60 y.o. male who presents for Follow-up:    Problem List Items Addressed This Visit       Essential hypertension    GERD (gastroesophageal reflux disease)    Never smoked cigarettes    Overweight with body mass index (BMI) of 28 to 28.9 in adult      Medical History[1]   Surgical History[2]   Family History[3]   Social History     Socioeconomic History    Marital status: Single     Spouse name: Not on file    Number of children: Not on file    Years of education: Not on file    Highest education level: Not on file   Occupational History    Not on file   Tobacco Use    Smoking status: Never    Smokeless tobacco: Never   Vaping Use    Vaping status: Never Used   Substance and Sexual Activity    Alcohol use: Yes     Alcohol/week: 6.0 standard drinks of alcohol     Types: 6 Cans of beer per week     Comment: Monthly    Drug use: Never    Sexual activity: Not Currently     Partners: Female     Birth control/protection: None   Other Topics Concern    Not on file   Social History Narrative    Not on file     Social Drivers of Health     Financial Resource Strain: Not on file   Food Insecurity: Not on file   Transportation Needs: Not on file   Physical Activity: Not on file   Stress: Not on file   Social Connections: Not on file   Intimate Partner Violence: Not on file   Housing Stability: Not on file      Hay fever and allergy relief   Current Medications[4]    Immunization History   Administered Date(s) Administered    COVID-19, mRNA, LNP-S, PF, 30 mcg/0.3 mL dose 04/10/2021, 05/02/2021, 12/05/2021    Flu vaccine (IIV4),  preservative free *Check age/dose* 09/27/2021, 09/30/2022    Flu vaccine, quadrivalent, no egg protein, age 6 month or greater (FLUCELVAX) 10/15/2018, 10/05/2020, 09/28/2023    Flu vaccine, trivalent, preservative free, age 6 months and greater (Fluarix/Fluzone/Flulaval) 11/01/2024    Influenza, Unspecified 11/01/2017    Influenza, injectable, MDCK, quadrivalent 10/14/2019    Pfizer COVID-19 vaccine, bivalent, age 12 years and older (30 mcg/0.3 mL) 09/30/2022    Pfizer Gray Cap SARS-CoV-2 05/17/2022    Tdap vaccine, age 7 year and older (BOOSTRIX, ADACEL) 06/23/2020    Zoster vaccine, recombinant, adult (SHINGRIX) 05/24/2024, 08/30/2024        Review of Systems   Constitutional: Negative.    HENT: Negative.     Eyes: Negative.    Respiratory: Negative.     Cardiovascular: Negative.    Gastrointestinal: Negative.    Endocrine: Negative.    Genitourinary: Negative.    Musculoskeletal: Negative.    Skin: Negative.    Allergic/Immunologic: Negative.    Hematological: Negative.    Psychiatric/Behavioral: Negative.     All other systems reviewed and are negative.       Vitals:    07/14/25 1108   BP: 132/78   Pulse: 72   Temp: 36.3 °C (97.3 °F)   SpO2: 97%     Vitals:    07/14/25 1108   Weight: 81.9 kg (180 lb 9.6 oz)      Physical Exam  Constitutional:       General: He is not in acute distress.     Appearance: Normal appearance.   Neurological:      Mental Status: He is alert.          ASSESSMENT/PLAN: Hypertension improved.  Continue amlodipine 5 mg and lisinopril 30 mg daily.  Continue to follow low-salt diet and exercise regularly.    Chronic GERD.  Try pantoprazole 20 mg daily.  Call in the next 1 months if the pantoprazole is not effective at relieving his GERD symptoms.  At that point we discussed the need to consider OTC omeprazole.    Otherwise follow-up in 6 months and call as needed              [1]   Past Medical History:  Diagnosis Date    Body mass index (BMI) 28.0-28.9, adult     BMI 28.0-28.9,adult    GERD  (gastroesophageal reflux disease) 01/01/2000    Hypertension 01/01/2000    Overweight 03/29/2022    Overweight with body mass index (BMI) of 29 to 29.9 in adult    Overweight     Overweight    Personal history of other specified conditions     History of urinary frequency    Personal history of other specified conditions 12/27/2021    History of urinary urgency    Personal history of other specified conditions 12/27/2021    History of nocturia    Poor urinary stream 12/27/2021    Weak urinary stream   [2]   Past Surgical History:  Procedure Laterality Date    CHOLECYSTECTOMY  01/01/2000    OTHER SURGICAL HISTORY  10/25/2021    Cholelithotomy    OTHER SURGICAL HISTORY  02/02/2022    Cholecystectomy    OTHER SURGICAL HISTORY  03/29/2022    Colonoscopy   [3]   Family History  Problem Relation Name Age of Onset    Hypertension Mother      Diabetes Mother      Hypertension Father      Diabetes Father     [4]   Current Outpatient Medications   Medication Sig Dispense Refill    amLODIPine (Norvasc) 5 mg tablet Take 1 tablet (5 mg) by mouth once daily. 90 tablet 3    lisinopril 30 mg tablet Take 1 tablet (30 mg) by mouth once daily. 90 tablet 3    pravastatin (Pravachol) 20 mg tablet Take 1 tablet (20 mg) by mouth once daily at bedtime. 90 tablet 3     No current facility-administered medications for this visit.

## 2025-07-14 NOTE — PROGRESS NOTES
Subjective     Patient ID: Humza Lantigua is a 60 y.o. male who presents for Follow-up:    Problem List Items Addressed This Visit    None     Medical History[1]   Surgical History[2]   Family History[3]   Social History     Socioeconomic History    Marital status: Single     Spouse name: Not on file    Number of children: Not on file    Years of education: Not on file    Highest education level: Not on file   Occupational History    Not on file   Tobacco Use    Smoking status: Never    Smokeless tobacco: Never   Vaping Use    Vaping status: Never Used   Substance and Sexual Activity    Alcohol use: Yes     Alcohol/week: 6.0 standard drinks of alcohol     Types: 6 Cans of beer per week     Comment: Monthly    Drug use: Never    Sexual activity: Not Currently     Partners: Female     Birth control/protection: None   Other Topics Concern    Not on file   Social History Narrative    Not on file     Social Drivers of Health     Financial Resource Strain: Not on file   Food Insecurity: Not on file   Transportation Needs: Not on file   Physical Activity: Not on file   Stress: Not on file   Social Connections: Not on file   Intimate Partner Violence: Not on file   Housing Stability: Not on file      Hay fever and allergy relief   Current Medications[4]    Immunization History   Administered Date(s) Administered    COVID-19, mRNA, LNP-S, PF, 30 mcg/0.3 mL dose 04/10/2021, 05/02/2021, 12/05/2021    Flu vaccine (IIV4), preservative free *Check age/dose* 09/27/2021, 09/30/2022    Flu vaccine, quadrivalent, no egg protein, age 6 month or greater (FLUCELVAX) 10/15/2018, 10/05/2020, 09/28/2023    Flu vaccine, trivalent, preservative free, age 6 months and greater (Fluarix/Fluzone/Flulaval) 11/01/2024    Influenza, Unspecified 11/01/2017    Influenza, injectable, MDCK, quadrivalent 10/14/2019    Pfizer COVID-19 vaccine, bivalent, age 12 years and older (30 mcg/0.3 mL) 09/30/2022    Pfizer Gray Cap SARS-CoV-2 05/17/2022    Tdap  vaccine, age 7 year and older (BOOSTRIX, ADACEL) 06/23/2020    Zoster vaccine, recombinant, adult (SHINGRIX) 05/24/2024, 08/30/2024        Review of Systems     Vitals:    07/14/25 1108   BP: 132/78   Pulse: 72   Temp: 36.3 °C (97.3 °F)   SpO2: 97%     Vitals:    07/14/25 1108   Weight: 81.9 kg (180 lb 9.6 oz)      Physical Exam     ASSESSMENT/PLAN:       Scribe Attestation  By signing my name below, I, Bessie Zapien LPN, Scribe   attest that this documentation has been prepared under the direction and in the presence of Akhil Vaughan MD.         [1]   Past Medical History:  Diagnosis Date    Body mass index (BMI) 28.0-28.9, adult     BMI 28.0-28.9,adult    GERD (gastroesophageal reflux disease) 01/01/2000    Hypertension 01/01/2000    Overweight 03/29/2022    Overweight with body mass index (BMI) of 29 to 29.9 in adult    Overweight     Overweight    Personal history of other specified conditions     History of urinary frequency    Personal history of other specified conditions 12/27/2021    History of urinary urgency    Personal history of other specified conditions 12/27/2021    History of nocturia    Poor urinary stream 12/27/2021    Weak urinary stream   [2]   Past Surgical History:  Procedure Laterality Date    CHOLECYSTECTOMY  01/01/2000    OTHER SURGICAL HISTORY  10/25/2021    Cholelithotomy    OTHER SURGICAL HISTORY  02/02/2022    Cholecystectomy    OTHER SURGICAL HISTORY  03/29/2022    Colonoscopy   [3]   Family History  Problem Relation Name Age of Onset    Hypertension Mother      Diabetes Mother      Hypertension Father      Diabetes Father     [4]   Current Outpatient Medications   Medication Sig Dispense Refill    amLODIPine (Norvasc) 5 mg tablet Take 1 tablet (5 mg) by mouth once daily. 90 tablet 3    lisinopril 30 mg tablet Take 1 tablet (30 mg) by mouth once daily. 90 tablet 3    omeprazole (PriLOSEC) 20 mg DR capsule Take 1 capsule (20 mg) by mouth once daily. 90 capsule 3    pravastatin  (Pravachol) 20 mg tablet Take 1 tablet (20 mg) by mouth once daily at bedtime. 90 tablet 3     No current facility-administered medications for this visit.

## 2026-01-19 ENCOUNTER — APPOINTMENT (OUTPATIENT)
Dept: PRIMARY CARE | Facility: CLINIC | Age: 61
End: 2026-01-19
Payer: COMMERCIAL